# Patient Record
Sex: MALE | Race: BLACK OR AFRICAN AMERICAN | NOT HISPANIC OR LATINO | Employment: OTHER | URBAN - METROPOLITAN AREA
[De-identification: names, ages, dates, MRNs, and addresses within clinical notes are randomized per-mention and may not be internally consistent; named-entity substitution may affect disease eponyms.]

---

## 2024-08-22 ENCOUNTER — HOSPITAL ENCOUNTER (EMERGENCY)
Facility: HOSPITAL | Age: 24
Discharge: HOME | End: 2024-08-22
Attending: EMERGENCY MEDICINE

## 2024-08-22 VITALS
OXYGEN SATURATION: 99 % | DIASTOLIC BLOOD PRESSURE: 77 MMHG | HEIGHT: 69 IN | RESPIRATION RATE: 20 BRPM | WEIGHT: 166 LBS | TEMPERATURE: 98 F | BODY MASS INDEX: 24.59 KG/M2 | SYSTOLIC BLOOD PRESSURE: 114 MMHG | HEART RATE: 74 BPM

## 2024-08-22 DIAGNOSIS — R30.0 DYSURIA: Primary | ICD-10-CM

## 2024-08-22 LAB
BACTERIA #/AREA URNS AUTO: ABNORMAL /HPF
BILIRUB UR QL STRIP.AUTO: NEGATIVE
C TRACH DNA SPEC QL NAA+PROBE: NOT DETECTED
CLARITY UR: CLEAR
COLOR UR AUTO: YELLOW
GLUCOSE UR QL STRIP: NORMAL
HGB UR QL STRIP: NEGATIVE
HYALINE CASTS #/AREA URNS LPF: ABNORMAL /LPF
KETONES UR QL STRIP: ABNORMAL
LEUKOCYTE ESTERASE UR QL STRIP: NEGATIVE
MUCOUS THREADS URNS QL MICRO: ABNORMAL /LPF
N GONORRHOEA DNA SPEC QL NAA+PROBE: NOT DETECTED
NITRITE UR QL STRIP: NEGATIVE
PH UR STRIP: 6 [PH]
PROT UR QL STRIP: ABNORMAL
RBC #/AREA URNS AUTO: ABNORMAL /HPF
SOURCE (OHS): NORMAL
SP GR UR STRIP.AUTO: 1.04 (ref 1–1.03)
SQUAMOUS #/AREA URNS LPF: ABNORMAL /HPF
UROBILINOGEN UR STRIP-ACNC: ABNORMAL
WBC #/AREA URNS AUTO: ABNORMAL /HPF

## 2024-08-22 PROCEDURE — 99282 EMERGENCY DEPT VISIT SF MDM: CPT

## 2024-08-22 PROCEDURE — 87591 N.GONORRHOEAE DNA AMP PROB: CPT | Performed by: PHYSICIAN ASSISTANT

## 2024-08-22 PROCEDURE — 87491 CHLMYD TRACH DNA AMP PROBE: CPT | Performed by: PHYSICIAN ASSISTANT

## 2024-08-22 PROCEDURE — 81001 URINALYSIS AUTO W/SCOPE: CPT | Performed by: PHYSICIAN ASSISTANT

## 2024-08-23 NOTE — ED PROVIDER NOTES
Encounter Date: 8/22/2024       History     Chief Complaint   Patient presents with    Dysuria    Hematuria     24-year-old male presents to the emergency department with complaints of 1 episode of dysuria with 1 drop of blood in his urine earlier today.  He states he was sexually active and he is concerned for STDs.  He denies urethral discharge, genital lesions, nausea, vomiting, fever, chills.    The history is provided by the patient. No  was used.     Review of patient's allergies indicates:  No Known Allergies  History reviewed. No pertinent past medical history.  History reviewed. No pertinent surgical history.  No family history on file.  Social History     Tobacco Use    Smoking status: Never    Smokeless tobacco: Never   Substance Use Topics    Alcohol use: Not Currently     Alcohol/week: 1.0 standard drink of alcohol     Types: 1 Shots of liquor per week    Drug use: Not Currently     Review of Systems   Constitutional:  Negative for chills and fever.   Respiratory:  Negative for cough and shortness of breath.    Cardiovascular:  Negative for chest pain and palpitations.   Gastrointestinal:  Negative for abdominal pain, nausea and vomiting.   Genitourinary:  Positive for dysuria and hematuria. Negative for flank pain.   Musculoskeletal:  Negative for back pain and neck pain.   Neurological:  Negative for dizziness, light-headedness and headaches.       Physical Exam     Initial Vitals [08/22/24 2014]   BP Pulse Resp Temp SpO2   114/77 74 20 98.1 °F (36.7 °C) 99 %      MAP       --         Physical Exam    Nursing note and vitals reviewed.  Constitutional: He appears well-developed and well-nourished.   HENT:   Nose: Nose normal.   Mouth/Throat: Oropharynx is clear and moist.   Cardiovascular:  Normal rate, regular rhythm, normal heart sounds and intact distal pulses.           Pulmonary/Chest: Breath sounds normal.   Abdominal: Abdomen is soft. Bowel sounds are normal. There is no  abdominal tenderness. There is no rebound and no guarding.   Musculoskeletal:         General: Normal range of motion.     Neurological: He is alert. GCS eye subscore is 4. GCS verbal subscore is 5. GCS motor subscore is 6.   Skin: Skin is warm.         ED Course   Procedures  Labs Reviewed   URINALYSIS, REFLEX TO URINE CULTURE - Abnormal       Result Value    Color, UA Yellow      Appearance, UA Clear      Specific Gravity, UA 1.041 (*)     pH, UA 6.0      Protein, UA 1+ (*)     Glucose, UA Normal      Ketones, UA Trace (*)     Blood, UA Negative      Bilirubin, UA Negative      Urobilinogen, UA 2+ (*)     Nitrites, UA Negative      Leukocyte Esterase, UA Negative      RBC, UA 0-5      WBC, UA 6-10 (*)     Bacteria, UA None Seen      Squamous Epithelial Cells, UA Trace (*)     Mucous, UA Few (*)     Hyaline Casts, UA None Seen     CHLAMYDIA/GONORRHOEAE(GC), PCR    Chlamydia trachomatis PCR Not Detected      N. gonorrhea PCR Not Detected      Source Urine      Narrative:     The Xpert CT/NG test, performed on the GeneXpert system is a qualitative in vitro real-time polymerase chain reaction (PCR) test for the automated detected and differentiation for genomic DNA from Chlamydia trachomatis (CT) and/or Neisseria gonorrhoeae (NG).          Imaging Results    None          Medications - No data to display  Medical Decision Making  24-year-old male presents to the emergency department with complaints of 1 episode of dysuria with 1 drop of blood in his urine earlier today.  He states he was sexually active and he is concerned for STDs.  He denies urethral discharge, genital lesions, nausea, vomiting, fever, chills.    DDx:  UTI, gonorrhea, chlamydia, Trichomonas.      Urinalysis overall unremarkable and no concerns for infection.  Gonorrhea/chlamydia negative.  He's had only 1 episode of dysuria with a drop of blood.  I recommended he report to health unit tomorrow for full STD workup.    Amount and/or Complexity of Data  Reviewed  Labs: ordered. Decision-making details documented in ED Course.               ED Course as of 08/22/24 2249   Thu Aug 22, 2024   2143 WBC, UA(!): 6-10 [ER]   2222 Chlamydia trachomatis PCR: Not Detected [ER]   2222 N. gonorrhea PCR: Not Detected [ER]   2247 The patient is resting comfortably and in no acute distress.   I personally discussed his test results.  Gave strict ED precautions and specific conditions for return to the emergency department and importance of follow up with pcp.  Patient voices understanding and agrees to the plan discussed. All patients' questions have been answered at this time. He has remained hemodynamically stable throughout entire stay in ED and is stable for discharge home. [ER]      ED Course User Index  [ER] Nyla Kc PA                             Clinical Impression:  Final diagnoses:  [R30.0] Dysuria (Primary)          ED Disposition Condition    Discharge Stable          ED Prescriptions    None       Follow-up Information       Follow up With Specialties Details Why Contact Info    [facility] - Emergency Dept Emergency Medicine  As needed, If symptoms worsen [address] [facility] [id number] [phone]             Nyla Kc PA  08/22/24 2605

## 2024-08-23 NOTE — DISCHARGE INSTRUCTIONS
No concerns for bladder infection.  Gonorrhea and chlamydia is negative today.  Report to health unit tomorrow for full STD testing.  Return if symptoms worsen.